# Patient Record
(demographics unavailable — no encounter records)

---

## 2024-11-07 NOTE — PROCEDURE
[Injection] : Injection [Left] : of the left [Knee Joint] : knee joint [Osteoarthritis] : Osteoarthritis [Patient] : patient [Risk] : risk [Benefits] : benefits [Alternatives] : alternatives [Bleeding] : bleeding [Infection] : infection [Allergic Reaction] : allergic reaction [Verbal Consent Obtained] : verbal consent was obtained prior to the procedure [Alcohol] : Alcohol [Ethyl Chloride Spray] : ethyl chloride spray was used as a topical anesthetic [Lateral] : lateral [20] : a 20-gauge [Bandage Applied] : a bandage [Tolerated Well] : The patient tolerated the procedure well [None] : none [No Strenuous Activity___day(s)] : avoid strenuous activity for [unfilled] day(s) [FreeTextEntry1] : chloraprep [FreeTextEntry8] : [facility] Lot #

## 2024-11-07 NOTE — PROCEDURE
[Injection] : Injection [Left] : of the left [Knee Joint] : knee joint [Osteoarthritis] : Osteoarthritis [Patient] : patient [Risk] : risk [Benefits] : benefits [Alternatives] : alternatives [Bleeding] : bleeding [Infection] : infection [Allergic Reaction] : allergic reaction [Verbal Consent Obtained] : verbal consent was obtained prior to the procedure [Alcohol] : Alcohol [Ethyl Chloride Spray] : ethyl chloride spray was used as a topical anesthetic [Lateral] : lateral [20] : a 20-gauge [Bandage Applied] : a bandage [Tolerated Well] : The patient tolerated the procedure well [None] : none [No Strenuous Activity___day(s)] : avoid strenuous activity for [unfilled] day(s) [FreeTextEntry1] : chloraprep [FreeTextEntry8] : [facility] Lot #

## 2024-12-03 NOTE — PROCEDURE
[Injection] : Injection [Left] : of the left [Knee Joint] : knee joint [Osteoarthritis] : Osteoarthritis [Patient] : patient [Risk] : risk [Benefits] : benefits [Alternatives] : alternatives [Bleeding] : bleeding [Infection] : infection [Allergic Reaction] : allergic reaction [Verbal Consent Obtained] : verbal consent was obtained prior to the procedure [Alcohol] : Alcohol [Ethyl Chloride Spray] : ethyl chloride spray was used as a topical anesthetic [Lateral] : lateral [20] : a 20-gauge [Bandage Applied] : a bandage [Tolerated Well] : The patient tolerated the procedure well [None] : none [No Strenuous Activity___day(s)] : avoid strenuous activity for [unfilled] day(s) [___ Week(s)] : in [unfilled] week(s) [de-identified] : #2 [facility] Lot # [id number], 8/7/26 [FreeTextEntry1] : chloraprep [FreeTextEntry8] : [facility] Lot # [id number], 8/7/26

## 2024-12-06 NOTE — PROCEDURE
[de-identified] : #2 [facility] Lot # [id number], 8/7/26 [FreeTextEntry1] : chloraprep [FreeTextEntry8] : [facility] Lot # [id number], 8/7/26

## 2024-12-06 NOTE — PROCEDURE
[de-identified] : #2 [facility] Lot # [id number], 8/7/26 [FreeTextEntry1] : chloraprep [FreeTextEntry8] : [facility] Lot # [id number], 8/7/26

## 2024-12-13 NOTE — PROCEDURE
[Injection] : Injection [Left] : of the left [Knee Joint] : knee joint [Osteoarthritis] : Osteoarthritis [Patient] : patient [Risk] : risk [Benefits] : benefits [Alternatives] : alternatives [Bleeding] : bleeding [Infection] : infection [Allergic Reaction] : allergic reaction [Verbal Consent Obtained] : verbal consent was obtained prior to the procedure [Alcohol] : Alcohol [Ethyl Chloride Spray] : ethyl chloride spray was used as a topical anesthetic [Lateral] : lateral [20] : a 20-gauge [Bandage Applied] : a bandage [Tolerated Well] : The patient tolerated the procedure well [None] : none [No Strenuous Activity___day(s)] : avoid strenuous activity for [unfilled] day(s) [PRN] : as needed [de-identified] : #3 [facility] Lot # [id number], 8/7/26 [FreeTextEntry1] : chloraprep [FreeTextEntry8] : [facility] Lot # [id number], 8/7/26

## 2024-12-13 NOTE — PROCEDURE
[Injection] : Injection [Left] : of the left [Knee Joint] : knee joint [Osteoarthritis] : Osteoarthritis [Patient] : patient [Risk] : risk [Benefits] : benefits [Alternatives] : alternatives [Bleeding] : bleeding [Infection] : infection [Allergic Reaction] : allergic reaction [Verbal Consent Obtained] : verbal consent was obtained prior to the procedure [Alcohol] : Alcohol [Ethyl Chloride Spray] : ethyl chloride spray was used as a topical anesthetic [Lateral] : lateral [20] : a 20-gauge [Bandage Applied] : a bandage [Tolerated Well] : The patient tolerated the procedure well [None] : none [No Strenuous Activity___day(s)] : avoid strenuous activity for [unfilled] day(s) [PRN] : as needed [de-identified] : #3 [facility] Lot # [id number], 8/7/26 [FreeTextEntry1] : chloraprep [FreeTextEntry8] : [facility] Lot # [id number], 8/7/26

## 2025-02-10 NOTE — PHYSICAL EXAM
[No Acute Distress] : no acute distress [No Respiratory Distress] : no respiratory distress  [Alert and Oriented x3] : oriented to person, place, and time [de-identified] : depressed mood and affect, tearful

## 2025-02-10 NOTE — HEALTH RISK ASSESSMENT
[0] : 2) Feeling down, depressed, or hopeless: Not at all (0) [PHQ-2 Negative - No further assessment needed] : PHQ-2 Negative - No further assessment needed [Never] : Never [[id number]] : 0

## 2025-02-10 NOTE — HISTORY OF PRESENT ILLNESS
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 he is more isolated, doesn't leave the house, is less social - he is currently not on medication or following with a therapist - this is a huge source of stress for her

## 2025-03-07 NOTE — ASSESSMENT
[FreeTextEntry1] : # Uncontrolled Type 2 Diabetes Mellitus  - off from medication for at least 3 months - pt expresses she only wants 2 medication for now - extensively discussed an importance of adherence to the medications as persistently hyperglycemia can lead to fatal complications such as MI, stroke, DKA, HHS etc. - check bmp, c-peptide today - pt agreeable to proceed with insulin + GLP-1 agonist, does not want metformin higher than 500 daily - start Basaglar 20 u nightly - start Mounjaro 2.5 mg weekly - counselled on SE such as abd pain, nausea, constipation, diarrhea, acute pancreatitis, small risk of development of rare thyroid cancer which has been shown in animal studies - renewed the diabetic supplies - reordered Kristy - counselled to check AM glucose and 2-hour post-prandial glucose - referral for nutritionist - pt refuses foot exam today - f/u with ophthalmology and podiatry - encourage pt to take statin however pt states she does not want to be started on more than 2 meds  Addendum: for the right side back pain upon flexion - appeared as MSK etiology, negative Costovertebral tenderness, no apparent swelling, rash or erythema.  Can try cold pack.  Encouraged pt to contact PCP office for thorough evaluation for possible need of further imaging.  If gets worsen disproportionately, advised pt to proceed to urgent care  d/w Dr. Navarro

## 2025-03-07 NOTE — PHYSICAL EXAM
[Alert] : alert [Well Nourished] : well nourished [No Acute Distress] : no acute distress [Well Developed] : well developed [Normal Sclera/Conjunctiva] : normal sclera/conjunctiva [EOMI] : extra ocular movement intact [No Proptosis] : no proptosis [Normal Oropharynx] : the oropharynx was normal [Thyroid Not Enlarged] : the thyroid was not enlarged [No Thyroid Nodules] : no palpable thyroid nodules [No Respiratory Distress] : no respiratory distress [No Accessory Muscle Use] : no accessory muscle use [Clear to Auscultation] : lungs were clear to auscultation bilaterally [Normal S1, S2] : normal S1 and S2 [Normal Rate] : heart rate was normal [Regular Rhythm] : with a regular rhythm [No Edema] : no peripheral edema [Pedal Pulses Normal] : the pedal pulses are present [Normal Bowel Sounds] : normal bowel sounds [Not Tender] : non-tender [Not Distended] : not distended [Soft] : abdomen soft [No CVA Tenderness] : no ~M costovertebral angle tenderness [Spine Straight] : spine straight [No Stigmata of Cushings Syndrome] : no stigmata of Cushings Syndrome [Normal Gait] : normal gait [Normal Strength/Tone] : muscle strength and tone were normal [No Rash] : no rash [Oriented x3] : oriented to person, place, and time [de-identified] : pain on flexion to the right side

## 2025-03-07 NOTE — ADDENDUM
[FreeTextEntry1] : Attending:  Pt seen with Dr. Cason today.  This is a very difficult pt, who wants to improve her control, but continues to resist medications and with whom every intervention needs to be negotiated.  At present she has been off all meds for the past 3 months.   She is willing to restart the levothyroxine--this does not seem to be a problem In terms of meds for the diabetes: --She does not want to take more than 500 mg/day of metformin, and this is not likely to offer much benefit at that dose.  (She nonetheless stopped to briefly speak to me as she was leaving the office, and I was able to explain about the misinformation she saw (as many pts have) on the Internet --She seemed to have tolerated the Mounjaro better than the Ozempic, though she cannot tell us whether she had appetite suppression or clearly improved glucoses on the 5 mg dose. --She can certainly start basal insulin, but it is possible that she also needs pre-meal insulin.  The Mounjaro will hopefully provide post-prandial glycemic control, but will need to check a C-peptide level to assess insulin reserves. She has agreed to take Lantus and Mounjaro at this point.  Will restart the Lantus at 20 units, the Mounjaro at 2.5 mg/week Asked her to do fingersticks at bedtime and at wake-up, in order to allow titration of the Lantus and avoidance of "over-basalizing " her Her diet remains an additional barrier to control, primarily portions of starch at the evening meal.  KOKO Navarro MD

## 2025-03-07 NOTE — HISTORY OF PRESENT ILLNESS
[FreeTextEntry1] : AMY DOWELL is a 54 year female with pmhx of T2D (dx in 2015, no known DM related complications), hypothyroidism (dx 2015), HLD, varicose veins who presents for T2D established care  Prior providers: Dr. King, Coco Robertson NP  Diabetes mellitus diagnosed in 2015 Family history:  father has diabetes Current meds:  not on any medications for about 3-6 months History: metformin (don't remember the side effects but pt read about negative effects on online) --> ozempic (nauseous stopped at 0.5 dose) --> Mounjaro 5mg (Feeling sick?) Basaglar 20 units (did not consistently take) Used to have dexcom however frequently fell off Pt states multiple medications have caused confusion which led to discontinuing of the medications Diet history: breakfast: coffee (black - 1 teaspoon of sugar), 2 wheat bread, omeletet lunch: yogurt, water, salad - pasta dinner: pasta, cooked snacking: nuts desserts: yogurt does not like soda or juices, but drink 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

## 2025-03-07 NOTE — PHYSICAL EXAM
[Alert] : alert [Well Nourished] : well nourished [No Acute Distress] : no acute distress [Well Developed] : well developed [Normal Sclera/Conjunctiva] : normal sclera/conjunctiva [EOMI] : extra ocular movement intact [No Proptosis] : no proptosis [Normal Oropharynx] : the oropharynx was normal [Thyroid Not Enlarged] : the thyroid was not enlarged [No Thyroid Nodules] : no palpable thyroid nodules [No Respiratory Distress] : no respiratory distress [No Accessory Muscle Use] : no accessory muscle use [Clear to Auscultation] : lungs were clear to auscultation bilaterally [Normal S1, S2] : normal S1 and S2 [Normal Rate] : heart rate was normal [Regular Rhythm] : with a regular rhythm [No Edema] : no peripheral edema [Pedal Pulses Normal] : the pedal pulses are present [Normal Bowel Sounds] : normal bowel sounds [Not Tender] : non-tender [Not Distended] : not distended [Soft] : abdomen soft [No CVA Tenderness] : no ~M costovertebral angle tenderness [Spine Straight] : spine straight [No Stigmata of Cushings Syndrome] : no stigmata of Cushings Syndrome [Normal Gait] : normal gait [Normal Strength/Tone] : muscle strength and tone were normal [No Rash] : no rash [Oriented x3] : oriented to person, place, and time [de-identified] : pain on flexion to the right side

## 2025-03-20 NOTE — ADDENDUM
[FreeTextEntry1] : Pt participated in a TTM encounter today (2-way audio). Pt gave consent for today's visit. Pt's preference for TTM today over telehealth.  Provider: Anita Soto PA-C Provider Location: [address] [place] [address] Patient: Zoey Charles Patient Location: Home

## 2025-03-20 NOTE — ADDENDUM
[FreeTextEntry1] : Pt participated in a TTM encounter today (2-way audio). Pt gave consent for today's visit. Pt's preference for TTM today over telehealth.  Provider: Anita Soto PA-C Provider Location: [address] [place] [address] Patient: Zoey Charles Patient Location: Home

## 2025-03-20 NOTE — HISTORY OF PRESENT ILLNESS
[FreeTextEntry8] : Pt is a 55 y/o F who has TTM today for eval of vaginal pruritus  Vaginal pruritus: - pt states her symptoms feel similar to when she had a yeast infection in the past. - she is requesting medication - no fever, chills, pain, vaginal discharge

## 2025-03-20 NOTE — HISTORY OF PRESENT ILLNESS
[FreeTextEntry8] : Pt is a 53 y/o F who has TTM today for eval of vaginal pruritus  Vaginal pruritus: - pt states her symptoms feel similar to when she had a yeast infection in the past. - she is requesting medication - no fever, chills, pain, vaginal discharge

## 2025-03-27 NOTE — PHYSICAL EXAM
[No Acute Distress] : no acute distress [No Respiratory Distress] : no respiratory distress  [de-identified] : depressed, mood and affect

## 2025-03-27 NOTE — HISTORY OF PRESENT ILLNESS
[FreeTextEntry1] : post ED follow up [de-identified] : ED visit:  right flank pain and hyperglycemia - CXR was normal - pt d/c w/ topical patches - pain is still present uses patches and ibuprofen to help manage - believes she may have tripped prior to pain and tried to brace herself, when she braced herself, she could have strained her muscles  uncontrolled A1c - not adherent to insulin or GLP1a - follows w/ endo - has been off medication for over 2 weeks now - states she has mutliple social stressors related to son which are her focus

## 2025-05-23 NOTE — ASSESSMENT
[FreeTextEntry1] : # Uncontrolled Type 2 Diabetes Mellitus  - off from medication for at least 3 months - pt expresses she only wants 2 medication for now - extensively discussed an importance of adherence to the medications as persistently hyperglycemia can lead to fatal complications such as MI, stroke, DKA, HHS etc. - check bmp, c-peptide today - pt agreeable to proceed with insulin + GLP-1 agonist, does not want metformin higher than 500 daily - start Basaglar 20 u nightly - start Mounjaro 2.5 mg or Ozempic 0.25 weekly - counselled on SE such as abd pain, nausea, constipation, diarrhea, acute pancreatitis, small risk of development of rare thyroid cancer which has been shown in animal studies - renewed the diabetic supplies - reordered Kristy - counselled to check AM glucose and 2-hour post-prandial glucose - referral for nutritionist - pt refuses foot exam today - f/u with ophthalmology and podiatry - encourage pt to take statin however pt states she does not want to be started on more than 2 med

## 2025-05-23 NOTE — HISTORY OF PRESENT ILLNESS
[FreeTextEntry1] : AMY DOWELL is a 54 year female with pmhx of T2D (dx in 2015, no known DM related complications), hypothyroidism (dx 2015), HLD, varicose veins who presents for T2D established care  Prior providers: Dr. King, Coco Robertson NP  05/23/2025  HPI 03/2025 Diabetes mellitus diagnosed in 2015 Family history:  father has diabetes Current meds:  not on any medications for about 3-6 months History: metformin (don't remember the side effects but pt read about negative effects on online) --> ozempic (nauseous stopped at 0.5 dose) --> Mounjaro 5mg (Feeling sick?) Basaglar 20 units (did not consistently take) Used to have dexcom however frequently fell off Pt states multiple medications have caused confusion which led to discontinuing of the medications Diet history: breakfast: coffee (black - 1 teaspoon of sugar), 2 wheat bread, omeletet lunch: yogurt, water, salad - pasta dinner: pasta, cooked snacking: nuts desserts: yogurt does not like soda or juices, but drink 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

## 2025-07-14 NOTE — REVIEW OF SYSTEMS
[Negative] : Integumentary [Frequency] : frequency [Headache] : headache [Dizziness] : no dizziness [Fainting] : no fainting [Confusion] : no confusion [Unsteady Walking] : no ataxia [Suicidal] : not suicidal [Insomnia] : no insomnia [Anxiety] : no anxiety [Depression] : no depression [de-identified] : mild tingling in toes on b/l feet; HA described as HA

## 2025-07-14 NOTE — REVIEW OF SYSTEMS
[Negative] : Integumentary [Frequency] : frequency [Headache] : headache [Dizziness] : no dizziness [Fainting] : no fainting [Confusion] : no confusion [Unsteady Walking] : no ataxia [Suicidal] : not suicidal [Insomnia] : no insomnia [Anxiety] : no anxiety [Depression] : no depression [de-identified] : mild tingling in toes on b/l feet; HA described as HA

## 2025-07-14 NOTE — PHYSICAL EXAM
[No Acute Distress] : no acute distress [Normal Sclera/Conjunctiva] : normal sclera/conjunctiva [EOMI] : extraocular movements intact [No Respiratory Distress] : no respiratory distress  [No Accessory Muscle Use] : no accessory muscle use [Clear to Auscultation] : lungs were clear to auscultation bilaterally [Normal Rate] : normal rate  [Regular Rhythm] : with a regular rhythm [Normal S1, S2] : normal S1 and S2 [Normal] : no rash [Coordination Grossly Intact] : coordination grossly intact [No Focal Deficits] : no focal deficits [Normal Gait] : normal gait [Speech Grossly Normal] : speech grossly normal [de-identified] : intermittenly tearful on exam [de-identified] : sad affect

## 2025-07-14 NOTE — PHYSICAL EXAM
[No Acute Distress] : no acute distress [Normal Sclera/Conjunctiva] : normal sclera/conjunctiva [EOMI] : extraocular movements intact [No Respiratory Distress] : no respiratory distress  [No Accessory Muscle Use] : no accessory muscle use [Clear to Auscultation] : lungs were clear to auscultation bilaterally [Normal Rate] : normal rate  [Regular Rhythm] : with a regular rhythm [Normal S1, S2] : normal S1 and S2 [Normal] : no rash [Coordination Grossly Intact] : coordination grossly intact [No Focal Deficits] : no focal deficits [Normal Gait] : normal gait [Speech Grossly Normal] : speech grossly normal [de-identified] : intermittenly tearful on exam [de-identified] : sad affect

## 2025-07-14 NOTE — HISTORY OF PRESENT ILLNESS
[FreeTextEntry1] : follow up for DM and for completion of paper work [de-identified] : 54 yo F with PMHx of CAD, HLD, hypothyroidism, obesity, presents for request for papers to be filled out. 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 Recent ED visit in Feb 2025 for critical value of 495 glucose found on outpatient labs. Current ROS: urinary frequency and headaches (every other day) described as pressure.  uncontrolled Hgba1c (last reading 15.5%) -non-adherent to basaglar 20units daily  -taking mounjaro (5mg weekly) as previously prescribed -follows with endo: last seen in March 2025 -6lb weight loss since Mar 27 2025 -interested in vein treatment center  hypothyroidism: -on levothyroxine 125mcg daily  HLD: -non-adherent, atorvastatin 40mg daily  caregiver burden for son w/ sz: -expresses significant stress with taking care of him, attempting to arrange services, bring him to medical appts -reports son being more isolated and withdrawn -expresses desire for therapy for son

## 2025-07-14 NOTE — PLAN
[FreeTextEntry1] :  uncontrolled diabetes -will draw labs hgba1c, alb/creat ratio, CMP, UA, lipid profile -recommendation to f/u with endo  paperwork -requested physician fill out paper work for work leave

## 2025-07-14 NOTE — HISTORY OF PRESENT ILLNESS
[FreeTextEntry1] : follow up for DM and for completion of paper work [de-identified] : 54 yo F with PMHx of CAD, HLD, hypothyroidism, obesity, presents for request for papers to be filled out. 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 Recent ED visit in Feb 2025 for critical value of 495 glucose found on outpatient labs. Current ROS: urinary frequency and headaches (every other day) described as pressure.  uncontrolled Hgba1c (last reading 15.5%) -non-adherent to basaglar 20units daily  -taking mounjaro (5mg weekly) as previously prescribed -follows with endo: last seen in March 2025 -6lb weight loss since Mar 27 2025 -interested in vein treatment center  hypothyroidism: -on levothyroxine 125mcg daily  HLD: -non-adherent, atorvastatin 40mg daily  caregiver burden for son w/ sz: -expresses significant stress with taking care of him, attempting to arrange services, bring him to medical appts -reports son being more isolated and withdrawn -expresses desire for therapy for son

## 2025-07-23 NOTE — PHYSICAL EXAM
[LE] : Sensory: Intact in bilateral lower extremities [Normal RLE] : Right Lower Extremity: No scars, rashes, lesions, ulcers, skin intact [Normal LLE] : Left Lower Extremity: No scars, rashes, lesions, ulcers, skin intact [Normal Touch] : sensation intact for touch [Obese] : obese [Normal] : Oriented to person, place, and time, insight and judgement were intact and the affect was normal [de-identified] : LEFT knee and right for comparison Mildly antalgic gait. Tender patella facets, medial joint line and also on the lateral joint line mildly No edema.  No ecchymoses or erythema and skin is intact.  No effusion appreciated Knee range of motion is 0 to 125 degrees flexion with pain on further flexion.  130 degrees flexion right knee Ia Lachman.  Negative anterior and posterior drawer.  No pain or instability with valgus stress at 0 or 30 degrees. There may be subtle increased varus stress. + pain with Janneth. Intact straight leg raise. Large varicose veins in her medial thigh and knee. Normal neurovascular exam distally.  Motor and sensation both intact. She does have weakness and pain with straight leg raise left greater than right lower extremity Left hip range of motion is mildly decreased and with mild pain in the groin. Tender over the greater trochanter. [de-identified] :  X-ray standing Bilateral knees weightbearing 4 views today to follow-up on knee arthritis compared to 2/1/23 showed moderate medial joint space narrowing left greater than right knee with osteophytes.  KL 2-3 changes.  Lateral compartment appears well-preserved.  Mild patellofemoral joint space narrowing  X-rays left hip AP and lateral views ordered today for hip pain were performed and reviewed and show no fractures.  No significant osteoarthritis.  No bone lesions.

## 2025-07-23 NOTE — HISTORY OF PRESENT ILLNESS
[FreeTextEntry8] : social stressors, son w/ schizophrenia - pt here to discuss current situation w/ son - Friday night 7/18/25 had to call EMS to transfer son to inpatient psych unit - son has been consistently non adherent to medication and outpatient treatment programs - his behavior was becoming more and more isolating. recently noted to be walking around apartment naked - He is currently at [facility] inpatient treatment [place] in [facility].  - : Eugenia Robison [phone]

## 2025-07-23 NOTE — PHYSICAL EXAM
[LE] : Sensory: Intact in bilateral lower extremities [Normal RLE] : Right Lower Extremity: No scars, rashes, lesions, ulcers, skin intact [Normal LLE] : Left Lower Extremity: No scars, rashes, lesions, ulcers, skin intact [Normal Touch] : sensation intact for touch [Obese] : obese [Normal] : Oriented to person, place, and time, insight and judgement were intact and the affect was normal [de-identified] : LEFT knee and right for comparison Mildly antalgic gait. Tender patella facets, medial joint line and also on the lateral joint line mildly No edema.  No ecchymoses or erythema and skin is intact.  No effusion appreciated Knee range of motion is 0 to 125 degrees flexion with pain on further flexion.  130 degrees flexion right knee Ia Lachman.  Negative anterior and posterior drawer.  No pain or instability with valgus stress at 0 or 30 degrees. There may be subtle increased varus stress. + pain with Janneth. Intact straight leg raise. Large varicose veins in her medial thigh and knee. Normal neurovascular exam distally.  Motor and sensation both intact. She does have weakness and pain with straight leg raise left greater than right lower extremity Left hip range of motion is mildly decreased and with mild pain in the groin. Tender over the greater trochanter. [de-identified] :  X-ray standing Bilateral knees weightbearing 4 views today to follow-up on knee arthritis compared to 2/1/23 showed moderate medial joint space narrowing left greater than right knee with osteophytes.  KL 2-3 changes.  Lateral compartment appears well-preserved.  Mild patellofemoral joint space narrowing  X-rays left hip AP and lateral views ordered today for hip pain were performed and reviewed and show no fractures.  No significant osteoarthritis.  No bone lesions.

## 2025-07-23 NOTE — PHYSICAL EXAM
[No Acute Distress] : no acute distress [Normal Sclera/Conjunctiva] : normal sclera/conjunctiva [No Respiratory Distress] : no respiratory distress  [Alert and Oriented x3] : oriented to person, place, and time [Normal Insight/Judgement] : insight and judgment were intact [de-identified] : b/l LE edema [de-identified] : depressed anxious affect

## 2025-07-23 NOTE — PHYSICAL EXAM
[LE] : Sensory: Intact in bilateral lower extremities [Normal RLE] : Right Lower Extremity: No scars, rashes, lesions, ulcers, skin intact [Normal LLE] : Left Lower Extremity: No scars, rashes, lesions, ulcers, skin intact [Normal Touch] : sensation intact for touch [Obese] : obese [Normal] : Oriented to person, place, and time, insight and judgement were intact and the affect was normal [de-identified] : LEFT knee and right for comparison Mildly antalgic gait. Tender patella facets, medial joint line and also on the lateral joint line mildly No edema.  No ecchymoses or erythema and skin is intact.  No effusion appreciated Knee range of motion is 0 to 125 degrees flexion with pain on further flexion.  130 degrees flexion right knee Ia Lachman.  Negative anterior and posterior drawer.  No pain or instability with valgus stress at 0 or 30 degrees. There may be subtle increased varus stress. + pain with Janneth. Intact straight leg raise. Large varicose veins in her medial thigh and knee. Normal neurovascular exam distally.  Motor and sensation both intact. She does have weakness and pain with straight leg raise left greater than right lower extremity Left hip range of motion is mildly decreased and with mild pain in the groin. Tender over the greater trochanter. [de-identified] :  X-ray standing Bilateral knees weightbearing 4 views today to follow-up on knee arthritis compared to 2/1/23 showed moderate medial joint space narrowing left greater than right knee with osteophytes.  KL 2-3 changes.  Lateral compartment appears well-preserved.  Mild patellofemoral joint space narrowing  X-rays left hip AP and lateral views ordered today for hip pain were performed and reviewed and show no fractures.  No significant osteoarthritis.  No bone lesions.

## 2025-07-23 NOTE — PHYSICAL EXAM
[No Acute Distress] : no acute distress [Normal Sclera/Conjunctiva] : normal sclera/conjunctiva [No Respiratory Distress] : no respiratory distress  [Alert and Oriented x3] : oriented to person, place, and time [Normal Insight/Judgement] : insight and judgment were intact [de-identified] : b/l LE edema [de-identified] : depressed anxious affect

## 2025-07-23 NOTE — ASSESSMENT
[FreeTextEntry1] : 55-year-old with left knee moderate arthritis with worsening pain recently.  She has also had several falls.  We x-rayed her left hip but there is no sign of arthritis or injury.  She may have some bursitis..  She has tried NSAIDs, physical therapy and the steroid injection.  She had done the hyaluronic acid, Orthovisc injections last December which were helpful and she would like to do them again.  These were ordered. Strengthening and weight loss can also help get stress of the knee and help decrease pain. She should really work on balance and strength training to try to prevent falls.  I referred her to therapy but it is hard because she does not have time. She should also speak more to her internist about workup given the falls and perhaps seeing neurologist.  She has lots of different joint pains and seeing a rheumatologist may be helpful.  Her mother has lupus.  She will f/u for the Orthovisc

## 2025-07-23 NOTE — HISTORY OF PRESENT ILLNESS
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 She had Orthovisc injection in December 2024 and steroid injection in September 2024. She feels that it did help somewhat and that overall her knee has improved somewhat. She does still continue to have pain with stairs and prolonged walking.  Stairs are very painful and difficult making it difficult to travel by subway.  At the end of the day she usually puts icy hot and takes Motrin. She also notes difficulty carrying heavy bags. She has not done any recent physical therapy.  She notes she has had some recent falls, most recent was a couple months ago and went to ER.  She has had 3 different falls in the past year.  She saw her PCP after. She is ok now but her LEFT hip had been sore.  She has lost 5lbs

## 2025-07-23 NOTE — HISTORY OF PRESENT ILLNESS
[FreeTextEntry8] : social stressors, son w/ schizophrenia - pt here to discuss current situation w/ son - Friday night 7/18/25 had to call EMS to transfer son to inpatient psych unit - son has been consistently non adherent to medication and outpatient treatment programs - his behavior was becoming more and more isolating. recently noted to be walking around apartment naked - He is currently at [facility] inpatient treatment [place] in [facility].  - : Eugenia Robison [phone]

## 2025-07-23 NOTE — HISTORY OF PRESENT ILLNESS
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 She had Orthovisc injection in December 2024 and steroid injection in September 2024. She feels that it did help somewhat and that overall her knee has improved somewhat. She does still continue to have pain with stairs and prolonged walking.  Stairs are very painful and difficult making it difficult to travel by subway.  At the end of the day she usually puts icy hot and takes Motrin. She also notes difficulty carrying heavy bags. She has not done any recent physical therapy.  She notes she has had some recent falls, most recent was a couple months ago and went to ER.  She has had 3 different falls in the past year.  She saw her PCP after. She is ok now but her LEFT hip had been sore.  She has lost 5lbs

## 2025-07-23 NOTE — HISTORY OF PRESENT ILLNESS
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 She had Orthovisc injection in December 2024 and steroid injection in September 2024. She feels that it did help somewhat and that overall her knee has improved somewhat. She does still continue to have pain with stairs and prolonged walking.  Stairs are very painful and difficult making it difficult to travel by subway.  At the end of the day she usually puts icy hot and takes Motrin. She also notes difficulty carrying heavy bags. She has not done any recent physical therapy.  She notes she has had some recent falls, most recent was a couple months ago and went to ER.  She has had 3 different falls in the past year.  She saw her PCP after. She is ok now but her LEFT hip had been sore.  She has lost 5lbs